# Patient Record
Sex: FEMALE | Employment: UNEMPLOYED | URBAN - METROPOLITAN AREA
[De-identification: names, ages, dates, MRNs, and addresses within clinical notes are randomized per-mention and may not be internally consistent; named-entity substitution may affect disease eponyms.]

---

## 2021-02-24 ENCOUNTER — OFFICE VISIT (OUTPATIENT)
Dept: PLASTIC SURGERY | Facility: CLINIC | Age: 41
End: 2021-02-24
Payer: COMMERCIAL

## 2021-02-24 DIAGNOSIS — Z41.1 ENCOUNTER FOR COSMETIC PROCEDURE: Primary | ICD-10-CM

## 2021-02-24 NOTE — LETTER
February 24, 2021  Re: Majo Correa  1980    Dear Dr. De León,    Thank you so much for referring Majo Correa to the [facility]. I had the pleasure of visiting with Majo today.     Attached you will find a copy of my note. Please feel free to reach out to me with any questions, [phone].     This office note has been dictated.         Your trust in our practice and care is much appreciated.    Sincerely,  ANDREW DUDLEY MD

## 2021-02-24 NOTE — LETTER
2/24/2021       RE: Majo Correa  [address] [place] [address]     Dear Colleague,    Thank you for referring your patient, Majo Corrae, to the [facility] at [facility]. Please see a copy of my visit note below.    This office note has been dictated.       Again, thank you for allowing me to participate in the care of your patient.      Sincerely,    ANDREW DUDLEY MD

## 2021-02-24 NOTE — LETTER
Date:April 22, 2021      Patient was self referred, no letter generated. Do not send.        [facility] Health Information

## 2021-02-25 NOTE — PROGRESS NOTES
Service Date: 2021      ADDENDUM:  She has a mole on her lower lip that straddles the vermillion border.  If she wanted, that could be resected and I would want to review with her the fact that it creates a scar.  It has a benign appearance.  If it is aesthetically problematic for her, or if she has noted a change, I think an excisional biopsy is a reasonable consideration.         ANDREW DUDLEY MD             D: 2021   T: 2021   MT: ms      Name:     SHAY PEÑA   MRN:      [id number]        Account:      [id number]   :      1980           Service Date: 2021      Document: [id number]

## 2021-02-25 NOTE — PROGRESS NOTES
Service Date: 02/24/2021      REASON FOR VISIT:  Ms. Correa is referred by her  who is  patient of ours for evaluation of facial aging.       SOCIAL HISTORY:  She is 40-years-old and she has been, as she described it, a tomboy a long time.  She worked in [employer] but for the last two years, has been home with three children, 5, 7 and 16.  She is a non-smoker.        ALLERGIES:     1) Latex in that she gets a rash, but not anaphylaxis.    2) Sulfa causes a rash.    3) Percocet made her rather agitated. Dilaudid was okay.  She has not had other pain medication.       MEDICATIONS:   She takes no medications on a regular basis.        PAST SURGICAL HISTORY:  She has a sling procedure in October of 2020.       PHYSICAL EXAMINATION:  She has Butler II skin.  It has wonderful quality to it in terms of hydration and texture.  She has some early laxity, a little bit of jowling and neck laxity.  She has a bit of brow ptosis.  There is not a lot of dermatochalasis.  She has somewhat deep nasolabial folds.  She has lost some midface volume beneath the zygoma and zygomatic arch area.  She has pseudo fat herniation of the lower lid and some violaceous vascular blush on the lower lid, a little more on the right than the left.        RECOMMENDATIONS/PLAN:  We spent 45 minutes in consultation today.  A wide range of options was discussed.  I think it is early to have a facelift, but she could if she wanted.  The same is true of a brow lift and a lower lid blepharoplasty with fat repositioning.  I told her we have nothing that is going to make the violaceous hue disappear, but the contour can be somewhat improved.  I would not want to make her volume depleted so I would reposition the fat.  Facelift is an option.  I think it is early.  The risks and benefits of these surgeries were discussed at length options that also include good skin care and we talked to her about vitamin C and retinoids and hydration.  She  can have a consultation with Usha for an overall game plan in that regard.  She might consider the use of fillers, both Restylane to the tear trough and the nasolabial creases, and Voluma to the mid face.  She will reflect on these issues and be back in touch with us if she has more questions.         ANDREW DUDLEY MD             D: 2021   T: 2021   MT: ms      Name:     SHAY PEÑA   MRN:      [id number]        Account:      [id number]   :      1980           Service Date: 2021      Document: [id number]

## 2021-03-31 ENCOUNTER — TELEPHONE (OUTPATIENT)
Dept: PLASTIC SURGERY | Facility: CLINIC | Age: 41
End: 2021-03-31

## 2021-03-31 NOTE — TELEPHONE ENCOUNTER
Called patient to review pre op arrangements for her upcoming surgery at [facility] for Browlift and Bilateral Lower Eyelid Blepharoplasty on May 17th, 2021.    Instructed patient to avoid NSAIDS and blood thinning medications 7 days prior to date of surgery.    Will need a responsible adult for a minimum of 48 hours after surgery.     Discussed compressive head bandage placed post operatively, wound care, risk of N&V after surgery,  to go/drive very slowly, no bending over, keep activity to walking only.  Informed her to call Dr. Merritt immediately should she have any double vision or any vision changes.    Reviewed wound care, asked her to  hydrogen peroxide, lots of qtips, and aquaphor after surgery.  She says her  is extremely supportive and will gladly help her with all her post op care.    Patient reported significant allergies to Sulfa, Percocet, and LATEX - reported these findings to Dr. Merritt and alerted the surgery center as well.  She says Dilauded has been effective for her in the past.    COVID testing to be determined by [facility] -they will contact her.    Emailed her the surgery packet.    Yenifer Mathis RN  3/31/2021 3:00 PM

## 2021-04-29 ENCOUNTER — OFFICE VISIT (OUTPATIENT)
Dept: PLASTIC SURGERY | Facility: CLINIC | Age: 41
End: 2021-04-29
Payer: COMMERCIAL

## 2021-04-29 DIAGNOSIS — Z41.1 ENCOUNTER FOR COSMETIC PROCEDURE: Primary | ICD-10-CM

## 2021-04-29 NOTE — LETTER
4/29/2021       RE: Majo Correa  [address] [place] [address]     Dear Colleague,    Thank you for referring your patient, Majo Correa, to the [facility] at [facility]. Please see a copy of my visit note below.    This office note has been dictated.       Again, thank you for allowing me to participate in the care of your patient.      Sincerely,    ANDREW DUDLEY MD

## 2021-04-29 NOTE — LETTER
April 29, 2021  Re: Majo Correa  1980    Dear Dr. De León,    Thank you so much for referring Majo Correa to the [facility]. I had the pleasure of visiting with Majo today.     Attached you will find a copy of my note. Please feel free to reach out to me with any questions, [phone].     This office note has been dictated.         Your trust in our practice and care is much appreciated.    Sincerely,  ANDREW DUDLEY MD

## 2021-04-29 NOTE — LETTER
Date:July 9, 2021      Patient was self referred, no letter generated. Do not send.        [facility] Health Information

## 2021-05-02 NOTE — PROGRESS NOTES
Service Date: 2021    REASON FOR VISIT: Shay is in today and is exploring Botox.      PROCEDURE:  We did 20 units of Botox into the -procerus area.      ASSESSMENT AND PLAN: She will let us know how that works out.  We are going to do a transconjunctival fat repositioning procedure in a couple of weeks.        Arnol Merritt MD        D: 2021   T: 2021   MT: ms    Name:     SHAY PEÑA  MRN:      [id number]        Account:      [id number]   :      1980           Service Date: 2021       Document: [id number]

## 2021-05-17 ENCOUNTER — TRANSFERRED RECORDS (OUTPATIENT)
Dept: HEALTH INFORMATION MANAGEMENT | Facility: CLINIC | Age: 41
End: 2021-05-17

## 2021-05-24 ENCOUNTER — OFFICE VISIT (OUTPATIENT)
Dept: PLASTIC SURGERY | Facility: CLINIC | Age: 41
End: 2021-05-24
Payer: COMMERCIAL

## 2021-05-24 DIAGNOSIS — Z98.890 POSTOPERATIVE STATE: Primary | ICD-10-CM

## 2021-05-24 NOTE — LETTER
May 24, 2021  Re: Shay Correa  1980    Thank you so much for referring Shay Correa to the [facility]. I had the pleasure of visiting with Shay today.     Attached you will find a copy of my note. Please feel free to reach out to me with any questions, [phone].       Service Date: 2021    HISTORY OF PRESENT ILLNESS: I had several conversations with Shay and her  over the weekend.   They sent in a photograph of the appearance of her forehead a day after surgery in which there was some red swelling.  Then the next day the skin broke down.  I empirically put her on doxycycline as she is sensitive to Sulfa drugs and Mupirocin.      PHYSICAL EXAMINATION:  It has gotten better since then.  There is no fluctuance.  We are going to let this continue to heal.  All of her sutures and staples are out.  Facial nerve works in all branches.  The lower lids look better.  The sutures are clipped.  Chromic sutures are clipped.  She has a tiny bit of ecchymosis and she is using Tobradex drops for that.      ASSESSMENT AND PLAN: I will see her again next week to make sure things are healing appropriately.      Arnol Merritt MD      D: 2021   T: 2021   MT: ms    Name:     SHAY CORREA  MRN:      [id number]        Account:      [id number]   :      1980           Service Date: 2021       Document: [id number]

## 2021-05-24 NOTE — PATIENT INSTRUCTIONS
1. Staples removed.   2. Wound care package given to patient.   3. Patient will follow up with Dr. Merritt in one week.

## 2021-05-24 NOTE — LETTER
5/24/2021       RE: Majo Correa  [address] [place] [address]     Dear Colleague,    Thank you for referring your patient, Majo Correa, to the [facility] at [facility]. Please see a copy of my visit note below.      This office note has been dictated.      Again, thank you for allowing me to participate in the care of your patient.      Sincerely,    ANDREW DUDLEY MD

## 2021-05-24 NOTE — LETTER
Date:August 5, 2021      Patient was self referred, no letter generated. Do not send.        [facility] Health Information

## 2021-05-26 ENCOUNTER — VIRTUAL VISIT (OUTPATIENT)
Dept: PLASTIC SURGERY | Facility: CLINIC | Age: 41
End: 2021-05-26
Payer: COMMERCIAL

## 2021-05-26 DIAGNOSIS — Z98.890 POSTOPERATIVE STATE: Primary | ICD-10-CM

## 2021-05-26 NOTE — LETTER
2021       RE: Shay Correa  [address] [place] [address]     Dear Colleague,    Thank you for referring your patient, Shay Correa, to the [facility] at [facility]. Please see a copy of my visit note below.    Service Date: 2021    I called Shay today.  She said the forehead continues to heal.  I reviewed the pathology report, which was entirely benign nevus.  She has found some staples that she is going to extricate or I may have her come in and have us take them out for her if that fits her schedule today. Otherwise she will keep her next scheduled appointment with us.     Arnol Merritt MD    D: 2021   T: 2021   MT: ms    Name:     SHAY CORREA  MRN:      [id number]        Account:      [id number]   :      1980           Service Date: 2021       Document: [id number]

## 2021-05-26 NOTE — PROGRESS NOTES
Service Date: 2021    HISTORY OF PRESENT ILLNESS: I had several conversations with Shay and her  over the weekend.   They sent in a photograph of the appearance of her forehead a day after surgery in which there was some red swelling.  Then the next day the skin broke down.  I empirically put her on doxycycline as she is sensitive to Sulfa drugs and Mupirocin.      PHYSICAL EXAMINATION:  It has gotten better since then.  There is no fluctuance.  We are going to let this continue to heal.  All of her sutures and staples are out.  Facial nerve works in all branches.  The lower lids look better.  The sutures are clipped.  Chromic sutures are clipped.  She has a tiny bit of ecchymosis and she is using Tobradex drops for that.      ASSESSMENT AND PLAN: I will see her again next week to make sure things are healing appropriately.      Arnol Merritt MD        D: 2021   T: 2021   MT: ms    Name:     SHAY PEÑA  MRN:      [id number]        Account:      [id number]   :      1980           Service Date: 2021       Document: [id number]

## 2021-05-26 NOTE — LETTER
Sanna 3, 2021  Re: Majo Peña  1980    Dear Dr. De León,    Thank you so much for referring Majo Peña to the [facility]. I had the pleasure of visiting with Majo today.     Attached you will find a copy of my note. Please feel free to reach out to me with any questions, [phone].     Service Date: 2021    I called Majo today.  She said the forehead continues to heal.  I reviewed the pathology report, which was entirely benign nevus.  She has found some staples that she is going to extricate or I may have her come in and have us take them out for her if that fits her schedule today. Otherwise she will keep her next scheduled appointment with us.     Arnol Merritt MD        D: 2021   T: 2021   MT: ms    Name:     MAJO PEÑA  MRN:      [id number]        Account:      [id number]   :      1980           Service Date: 2021       Document: [id number]        Your trust in our practice and care is much appreciated.    Sincerely,  ARNOL MERRITT MD

## 2021-05-26 NOTE — LETTER
Date:August 13, 2021      Patient was self referred, no letter generated. Do not send.        [facility] Health Information

## 2021-05-27 NOTE — PROGRESS NOTES
Service Date: 2021    I called Shay today.  She said the forehead continues to heal.  I reviewed the pathology report, which was entirely benign nevus.  She has found some staples that she is going to extricate or I may have her come in and have us take them out for her if that fits her schedule today. Otherwise she will keep her next scheduled appointment with us.     Arnol Merritt MD        D: 2021   T: 2021   MT: ms    Name:     SHAY PEÑA  MRN:      [id number]        Account:      [id number]   :      1980           Service Date: 2021       Document: [id number]

## 2021-06-17 ENCOUNTER — HOSPITAL ENCOUNTER (OUTPATIENT)
Dept: LAB | Facility: CLINIC | Age: 41
End: 2021-06-17
Attending: OTOLARYNGOLOGY
Payer: COMMERCIAL

## 2021-06-17 ENCOUNTER — OFFICE VISIT (OUTPATIENT)
Dept: PLASTIC SURGERY | Facility: CLINIC | Age: 41
End: 2021-06-17
Payer: COMMERCIAL

## 2021-06-17 DIAGNOSIS — Z98.890 POSTOPERATIVE STATE: ICD-10-CM

## 2021-06-17 DIAGNOSIS — Z22.322 CARRIER OR SUSPECTED CARRIER OF METHICILLIN RESISTANT STAPHYLOCOCCUS AUREUS: Primary | ICD-10-CM

## 2021-06-17 LAB
MRSA DNA SPEC QL NAA+PROBE: NORMAL
SPECIMEN SOURCE: NORMAL

## 2021-06-17 NOTE — LETTER
6/17/2021       RE: Majo Correa  [address] [place] [address]     Dear Colleague,    Thank you for referring your patient, Majo Correa, to the [facility] at [facility]. Please see a copy of my visit note below.    This office note has been dictated. This office note has been dictated.       Again, thank you for allowing me to participate in the care of your patient.      Sincerely,    ANDREW DUDLEY MD

## 2021-06-17 NOTE — LETTER
June 17, 2021  Re: Majo Correa  1980    Dear Dr. De León,    Thank you so much for referring Majo Correa to the [facility]. I had the pleasure of visiting with Majo today.     Attached you will find a copy of my note. Please feel free to reach out to me with any questions, [phone].     This office note has been dictated. This office note has been dictated.         Your trust in our practice and care is much appreciated.    Sincerely,  ANDREW DUDLEY MD

## 2021-06-20 NOTE — PROGRESS NOTES
Service Date: 2021    REASON FOR VISIT: Shay is in today.  She has a prominent buried 2-0 PDS suture in the right paramedian incision with some adjacent crusting.      PROCEDURE: I put in a local anesthetic, prepped the area, snipped out the PDS suture and a small Vicryl suture and closed it with three interrupted 4-0 Chromic sutures.      ASSESSMENT AND PLAN: Home cares were discussed. She will follow up with us when she gets back from a trip to [place].      Arnol Merritt MD        D: 2021   T: 2021   MT: ms    Name:     SHAY PEÑA  MRN:      [id number]        Account:      [id number]   :      1980           Service Date: 2021       Document: [id number]

## 2021-08-26 ENCOUNTER — OFFICE VISIT (OUTPATIENT)
Dept: PLASTIC SURGERY | Facility: CLINIC | Age: 41
End: 2021-08-26
Payer: COMMERCIAL

## 2021-08-26 DIAGNOSIS — Z98.890 POSTOPERATIVE STATE: Primary | ICD-10-CM

## 2021-08-29 NOTE — PROGRESS NOTES
Service Date: 2021    REASON FOR VISIT:  Shay is in today.  She still notes a little bit of depression where the infection occurred in the forehead.      DISCUSSION:  We discussed with her and her , that infections in this area after surgery are extremely uncommon.  I have seen infections along the incision lines but not in the middle of the forehead like this.  It is a little hypopigmented and a little depressed.  We talked about botox to help with scar formation.      PROCEDURE:  I injected the frontalis and the -procerus muscles.  I did not charge them for it.      ASSESSMENT AND PLAN:  We will see how this works out.  She also has concerns about the incisions.  They still occasionally ooze.  She has had a few ingrown hairs.  The right paramedian incision is still problematic.  She has lost some hairs in it.  I will let this go through a cycle to see if it does settle out and the hair regrows.  Otherwise, down the road as an office procedure, we might revise the scar.  Her lower lids look good.  She and her  still note the vein in the right lower lid.  I have no way of making this better.  We reviewed her photographs.  Her squint lines are better.  Her brow position is better..  The hooding of her upper lid is better.  The lower lid contour is better.  The rhytids on the lower lid are better.  I do not think she needs any filler at this time.  She will follow up with us in several months.    Arnol Merritt MD        D: 2021   T: 2021   MT: ms    Name:     SHAY PEÑA  MRN:      [id number]        Account:      [id number]   :      1980           Service Date: 2021       Document: [id number]

## 2021-11-05 ENCOUNTER — HOSPITAL ENCOUNTER (OUTPATIENT)
Dept: MAMMOGRAPHY | Facility: CLINIC | Age: 41
End: 2021-11-05
Attending: STUDENT IN AN ORGANIZED HEALTH CARE EDUCATION/TRAINING PROGRAM
Payer: COMMERCIAL

## 2021-11-05 ENCOUNTER — HOSPITAL ENCOUNTER (OUTPATIENT)
Dept: ULTRASOUND IMAGING | Facility: CLINIC | Age: 41
End: 2021-11-05
Attending: STUDENT IN AN ORGANIZED HEALTH CARE EDUCATION/TRAINING PROGRAM
Payer: COMMERCIAL

## 2021-11-05 DIAGNOSIS — N63.41 UNSPECIFIED LUMP IN RIGHT BREAST, SUBAREOLAR: ICD-10-CM

## 2021-11-05 PROCEDURE — 76642 ULTRASOUND BREAST LIMITED: CPT | Mod: 50

## 2021-11-05 PROCEDURE — 77062 BREAST TOMOSYNTHESIS BI: CPT

## 2021-11-11 ENCOUNTER — OFFICE VISIT (OUTPATIENT)
Dept: PLASTIC SURGERY | Facility: CLINIC | Age: 41
End: 2021-11-11

## 2021-11-11 DIAGNOSIS — Z41.1 ENCOUNTER FOR COSMETIC PROCEDURE: Primary | ICD-10-CM

## 2021-11-11 NOTE — LETTER
Date:December 30, 2021      Patient was self referred, no letter generated. Do not send.        [facility] Health Information

## 2021-11-11 NOTE — LETTER
11/11/2021       RE: Majo Correa  [address] [place] [address]     Dear Colleague,    Thank you for referring your patient, Majo Correa, to the [facility] at [facility]. Please see a copy of my visit note below.    This office note has been dictated.       Again, thank you for allowing me to participate in the care of your patient.      Sincerely,    ANDREW DUDLEY MD

## 2021-11-11 NOTE — LETTER
Date:December 30, 2021      Patient was self referred, no letter generated. Do not send.        [facility] Health Information

## 2021-11-11 NOTE — LETTER
November 11, 2021  Re: Majo Correa  1980    Dear Dr. De León,    Thank you so much for referring Majo Correa to the [facility]. I had the pleasure of visiting with Majo today.     Attached you will find a copy of my note. Please feel free to reach out to me with any questions, [phone].     This office note has been dictated.         Your trust in our practice and care is much appreciated.    Sincerely,  ANDREW DUDLEY MD

## 2021-11-13 NOTE — PROGRESS NOTES
Service Date: 2021    REASON FOR VISIT: Shay is in today.      PROCEDURE: I placed 20 units of botox in her -procerus.      PHYSICAL EXAMINATION: Her brows look terrific.  The scar on her forehead is fading, it is just a slight whitened area.  It was a very small contour irregularity.     ASSESSMENT AND PLAN: I am just going to let this continue to mature.  Her brows look terrific.  She will be back to see us as needed.      Arnol Merritt MD        D: 2021   T: 2021   MT: ms    Name:     SHAY PEÑA  MRN:      [id number]        Account:      [id number]   :      1980           Service Date: 2021       Document: [id number]

## 2022-12-08 ENCOUNTER — LAB REQUISITION (OUTPATIENT)
Dept: LAB | Facility: CLINIC | Age: 42
End: 2022-12-08

## 2022-12-08 DIAGNOSIS — Z13.220 ENCOUNTER FOR SCREENING FOR LIPOID DISORDERS: ICD-10-CM

## 2022-12-08 DIAGNOSIS — R53.83 OTHER FATIGUE: ICD-10-CM

## 2022-12-08 LAB
ALBUMIN SERPL BCG-MCNC: 4.8 G/DL (ref 3.5–5.2)
ALP SERPL-CCNC: 43 U/L (ref 35–104)
ALT SERPL W P-5'-P-CCNC: 14 U/L (ref 10–35)
ANION GAP SERPL CALCULATED.3IONS-SCNC: 11 MMOL/L (ref 7–15)
AST SERPL W P-5'-P-CCNC: 20 U/L (ref 10–35)
BILIRUB SERPL-MCNC: 0.6 MG/DL
BUN SERPL-MCNC: 12.2 MG/DL (ref 6–20)
CALCIUM SERPL-MCNC: 9.5 MG/DL (ref 8.6–10)
CHLORIDE SERPL-SCNC: 101 MMOL/L (ref 98–107)
CHOLEST SERPL-MCNC: 253 MG/DL
CREAT SERPL-MCNC: 0.7 MG/DL (ref 0.51–0.95)
DEPRECATED CALCIDIOL+CALCIFEROL SERPL-MC: 49 UG/L (ref 20–75)
DEPRECATED HCO3 PLAS-SCNC: 27 MMOL/L (ref 22–29)
ERYTHROCYTE [SEDIMENTATION RATE] IN BLOOD BY WESTERGREN METHOD: 9 MM/HR (ref 0–20)
FOLATE SERPL-MCNC: 11.8 NG/ML (ref 4.6–34.8)
GFR SERPL CREATININE-BSD FRML MDRD: >90 ML/MIN/1.73M2
GLUCOSE SERPL-MCNC: 89 MG/DL (ref 70–99)
HDLC SERPL-MCNC: 65 MG/DL
LDLC SERPL CALC-MCNC: 166 MG/DL
NONHDLC SERPL-MCNC: 188 MG/DL
POTASSIUM SERPL-SCNC: 4.4 MMOL/L (ref 3.4–5.3)
PROT SERPL-MCNC: 7.6 G/DL (ref 6.4–8.3)
SODIUM SERPL-SCNC: 139 MMOL/L (ref 136–145)
TRIGL SERPL-MCNC: 112 MG/DL
TSH SERPL DL<=0.005 MIU/L-ACNC: 1.32 UIU/ML (ref 0.3–4.2)
VIT B12 SERPL-MCNC: 687 PG/ML (ref 232–1245)

## 2022-12-08 PROCEDURE — 85652 RBC SED RATE AUTOMATED: CPT | Performed by: STUDENT IN AN ORGANIZED HEALTH CARE EDUCATION/TRAINING PROGRAM

## 2022-12-08 PROCEDURE — 80053 COMPREHEN METABOLIC PANEL: CPT | Performed by: STUDENT IN AN ORGANIZED HEALTH CARE EDUCATION/TRAINING PROGRAM

## 2022-12-08 PROCEDURE — 82607 VITAMIN B-12: CPT | Performed by: STUDENT IN AN ORGANIZED HEALTH CARE EDUCATION/TRAINING PROGRAM

## 2022-12-08 PROCEDURE — 82306 VITAMIN D 25 HYDROXY: CPT | Performed by: STUDENT IN AN ORGANIZED HEALTH CARE EDUCATION/TRAINING PROGRAM

## 2022-12-08 PROCEDURE — 82040 ASSAY OF SERUM ALBUMIN: CPT | Performed by: STUDENT IN AN ORGANIZED HEALTH CARE EDUCATION/TRAINING PROGRAM

## 2022-12-08 PROCEDURE — 82746 ASSAY OF FOLIC ACID SERUM: CPT | Performed by: STUDENT IN AN ORGANIZED HEALTH CARE EDUCATION/TRAINING PROGRAM

## 2022-12-08 PROCEDURE — 84443 ASSAY THYROID STIM HORMONE: CPT | Performed by: STUDENT IN AN ORGANIZED HEALTH CARE EDUCATION/TRAINING PROGRAM

## 2022-12-08 PROCEDURE — 80061 LIPID PANEL: CPT | Performed by: STUDENT IN AN ORGANIZED HEALTH CARE EDUCATION/TRAINING PROGRAM
